# Patient Record
Sex: FEMALE | Race: NATIVE HAWAIIAN OR OTHER PACIFIC ISLANDER
[De-identification: names, ages, dates, MRNs, and addresses within clinical notes are randomized per-mention and may not be internally consistent; named-entity substitution may affect disease eponyms.]

---

## 2017-03-17 NOTE — PCM.SURG1
Surgeon's Initial Post Op Note





- Surgeon's Notes


Surgeon: Michelle Medley MD


Assistant: none


Type of Anesthesia: General LMA


Pre-Operative Diagnosis: Fibroids, pelvic pain


Operative Findings: 10 week size uterus, bilateral ostia visualized, unable to 

sound uteurs initially due to  type myoma located in uterus/ cervical 

cancer adn visulized immediately with hysteroscopy. Myosure represenative 

present for entire case.


Post-Operative Diagnosis: Submucosal myoma, pelvic pain


Operation Performed: Hystersscopic myomecetomy, Dilation and Currettage


Specimen/Specimens Removed: Endocervical currettings, endometrial currettings, 

submucosal myoma


Estimated Blood Loss: EBL {In ML}: 20


Blood Products Given: N/A


Drains Used: No Drains


Post-Op Condition: Good


Date of Surgery/Procedure: 17


Time of Surgery/Procedure: 13:00

## 2017-03-18 NOTE — OP
PROCEDURE DATE: 03/17/2017



DATE OF SURGERY:  03/17/2017



SURGEON:  Dr. Michelle Medley.



ASSISTANT:  None.



TYPE OF ANESTHESIA:  General LMA.



PREOPERATIVE DIAGNOSIS:  Fibroids.  Pelvic pain.



OPERATIVE FINDINGS:  A 12-week size uterus, bilateral ostia visualized.  Unable to sound  uterus init
ially due to aborting type myoma located within uterus/cervix but immediately visualized with hystero
scopy ____ for entire case, good removal of submucosal myoma.



POSTOPERATIVE DIAGNOSIS:  Submucosal myoma.  Pelvic pain.



OPERATION PERFORMED:  Hysteroscopic myomectomy, dilation and curettage.



SPECIMEN REMOVED:  Endocervical curettings, endometrial curettings, submucosal myoma.



ESTIMATED BLOOD LOSS:  20 mL.



BLOOD PRODUCTS:  None.



COMPLICATIONS:  None.



DESCRIPTION OF PROCEDURE:  The patient was brought to the operating room where she given anesthesia. 
 Once we had adequate anesthesia, she was placed on the operating table in dorsal supine position wit
h legs supported using stirrups.  The patient was then prepped and draped in the usual normal sterile
 fashion.  Timeout confirmed correct patient, correct procedure. 



A bimanual exam was performed with above-mentioned findings and a red rubber catheter inserted into t
he urethra to drain the bladder which was drained of 30 mL of clear yellow urine.  Following this, a 
Cedillo retractor was placed in the ____ vagina.  The cervix was adequately visualized and a single toot
h tenaculum was placed on the anterior lip of the cervix.  



The uterus was then attempted to be sounded, but was unsuccessful due to a mass noted within the cavi
ty.  A smaller dilator was then carefully inserted.  However, there was difficulty sounding, so the c
ervix was just sequentially dilated with the Adrien dilator to allow for introduction of the MyoSure hy
steroscope under direct visualization using normal saline as the distending media.  



Upon immediate visualization noted within the cervical canal in close proximity to the uterus, there 
was an enlarged myoma noted to be approximately 3 cm noted.  The MyoSure device was then activated an
d the hysteroscope carefully dissected and removed the myoma. 



Following this, we were able to enter to the fundus of the uterine cavity and there was good visualiz
ation noted by bilateral ostia.  A gentle curettage was done 360 degrees until a gritty texture was n
oted.  



All instruments were removed including the single tooth tenaculum and there was good hemostasis at th
e tenaculum puncture sites.  All instruments were removed.  At the end of the procedure, all needle, 
sponge and instrument counts were noted to be correct x 2.  The patient tolerated the procedure well 
and was transferred to recovery in stable condition.





__________________________________________

Michelle Medley MD







cc:



DD: 03/17/2017 22:06:10  [id number]

TT: 03/18/2017 06:07:07

Job # [id number]

tn



03/18/2017 07:14:19

## 2017-03-18 NOTE — OP
PROCEDURE DATE: 03/17/2017



SURGEON:  Dr. Michelle Medley.



ASSISTANT:  None.



TYPE OF ANESTHESIA:  General LMA.



PREOPERATIVE DIAGNOSES:  Fibroids, pelvic pain.



OPERATIVE FINDINGS:  Ten week size uterus, bilateral ostia visualized, unable to sound uterus initial
ly due to aborting _____ myoma located in uterus/cervical canal and immediately visualized with hyste
roscopy, MyoSure representative present for entire case.



POSTOPERATIVE DIAGNOSIS:  Submucosal myoma/_____ pelvic pain.



OPERATION PERFORMED:  Hysteroscopic myomectomy, dilation and curettage.



SPECIMEN REMOVED:  Endocervical curettings, endometrial curettings, submucosal myoma.





__________________________________________

Michelle Medley MD







cc:



DD: 03/17/2017 21:57:38  [id number]

TT: 03/18/2017 05:59:41

Job # [id number]

tn

## 2018-10-07 ENCOUNTER — HOSPITAL ENCOUNTER (EMERGENCY)
Dept: HOSPITAL 31 - C.ER | Age: 53
Discharge: HOME | End: 2018-10-07
Payer: COMMERCIAL

## 2018-10-07 VITALS — TEMPERATURE: 98.8 F | SYSTOLIC BLOOD PRESSURE: 126 MMHG | HEART RATE: 82 BPM | DIASTOLIC BLOOD PRESSURE: 80 MMHG

## 2018-10-07 VITALS — BODY MASS INDEX: 23.6 KG/M2

## 2018-10-07 VITALS — OXYGEN SATURATION: 98 % | RESPIRATION RATE: 18 BRPM

## 2018-10-07 DIAGNOSIS — J06.9: Primary | ICD-10-CM

## 2018-10-07 LAB
BILIRUB UR-MCNC: NEGATIVE MG/DL
GLUCOSE UR STRIP-MCNC: NORMAL MG/DL
HCG,QUALITATIVE URINE: NEGATIVE
LEUKOCYTE ESTERASE UR-ACNC: (no result) LEU/UL
PH UR STRIP: 7 [PH] (ref 5–8)
PROT UR STRIP-MCNC: NEGATIVE MG/DL
RBC # UR STRIP: NEGATIVE /UL
SP GR UR STRIP: 1 (ref 1–1.03)
UROBILINOGEN UR-MCNC: NORMAL MG/DL (ref 0.2–1)

## 2018-10-07 NOTE — C.PDOC
History Of Present Illness


53-year-old female, presents to the emergency department complaining of nasal 

congestion, runny nose, post-nasal drip, and cough that started six days ago. 

Patient went to urgent care and was given Zithromax, which she is taking, and 

also "left over" Amoxicillin from her refrigerator. Patient notes she also 

developed vaginal burning and dysuria. States she is unable to sleep at night 

because she is uncomfortable, prompting visit. No other complaints at this time.


Chief Complaint (Nursing): Cough, Cold, Congestion


History Per: Patient


History/Exam Limitations: no limitations





Past Medical History


Reviewed: Historical Data, Nursing Documentation, Vital Signs


Vital Signs: 





                                Last Vital Signs











Temp  98.6 F   10/07/18 10:02


 


Pulse  86   10/07/18 10:02


 


Resp  18   10/07/18 10:02


 


BP  139/90   10/07/18 10:02


 


Pulse Ox  98   10/07/18 10:02














- Medical History


PMH: HTN, Hypercholesterolemia, Migraine


Family History: States: No Known Family Hx





- Social History


Hx Alcohol Use: No


Hx Substance Use: No





- Immunization History


Hx Tetanus Toxoid Vaccination: No


Hx Influenza Vaccination: No


Hx Pneumococcal Vaccination: No





Review Of Systems


Constitutional: Negative for: Fever


ENT: Positive for: Throat Pain


Respiratory: Positive for: Cough.  Negative for: Shortness of Breath


Gastrointestinal: Negative for: Vomiting


Genitourinary: Positive for: Dysuria, Vaginal Discharge


Skin: Negative for: Rash





Physical Exam





- Physical Exam


Appears: Non-toxic, No Acute Distress


Skin: Warm, Dry, No Rash


Head: Atraumatic, Normacephalic


Eye(s): bilateral: Normal Inspection, PERRL, EOMI


Nose: Discharge


Oral Mucosa: Moist


Lips: Normal Appearing


Throat: No Erythema, No Exudate, No Drooling, No Mass


Neck: Normal ROM


Cardiovascular: Rhythm Regular, No Murmur


Respiratory: Normal Breath Sounds, No Accessory Muscle Use


Pelvic: Normal Speculum Exam, Vaginal Discharge (white.), Other (No cervicitis, 

+erythema)


Extremity: Normal ROM, No Deformity


Neurological/Psych: Oriented x3, Normal Speech





ED Course And Treatment


O2 Sat by Pulse Oximetry: 98


Pulse Ox Interpretation: Normal (RA)





- Other Rad


  ** CXR


X-Ray: Viewed By Me, Read By Radiologist


Interpretation: Accession No. : [id number].  Patient Name / ID : KRYSTYNA BARTH  / [id number].  Exam Date : 10/07/2018 10:47:50 ( Approved ).  Study 

Comment :  Sex / Age : F  / 53Y.  Creator : Ovidio Swift MD.  Dictator : 

Ovidio Swift MD.   :  Approver : Ovidio Swift MD.  Appr

over2 :  Report Date : 10/07/2018 13:45:04.  My Comment :  

********************************************************************************


***.  Date of service:  10/07/2018.  HISTORY:  cough/congestion.  COMPARISON:  

No prior.  TECHNIQUE:  Chest PA and lateral.  FINDINGS:  LUNGS:  Minor bibasilar

atelectasis and/or scarring changes left greater than right.  PLEURA:  No 

significant pleural effusion identified. No pneumothorax apparent.  

CARDIOVASCULAR:  Heart appears mildly enlarged.  OSSEOUS STRUCTURES:  No 

significant abnormalities.  VISUALIZED UPPER ABDOMEN:  Normal.  OTHER FINDINGS: 

None.  IMPRESSION:  Minor bibasilar atelectasis and/or scarring changes left 

greater than right


Progress Note: Patient is stable to be d/c home with PMD follow up.





Disposition





- Disposition


Disposition: HOME/ ROUTINE


Disposition Time: 11:33


Condition: STABLE


Additional Instructions: 


Follow up with PMD within 1-2 days. Return to ED if feel worse.


Prescriptions: 


Metronidazole [Metrogel-Vaginal] 1 ea VG QPM 7 Days #7 gel


Albuterol Sulfate [Proair Hfa] 1 puff IH Q6 PRN #1 inh


 PRN Reason: Cough


Promethazine HCl/Codeine [Prometh-Codein 6.25-10 mg/5 ml] 5 ml PO .Q4-6H #150 ml


Phenazopyridine [Pyridium] 200 mg PO TID #15 tab


Instructions:  Vaginitis, Upper Respiratory Infection (ED)


Forms:  [facility] (English), Work Excuse





- Clinical Impression


Clinical Impression: 


 Upper respiratory infection








- Scribe Statement


The provider has reviewed the documentation as recorded by the Scribe (Santos Harvey)








All medical record entries made by the Scribe were at my direction and 

personally dictated by me. I have reviewed the chart and agree that the record 

accurately reflects my personal performance of the history, physical exam, 

medical decision making, and the department course for this patient. I have also

 personally directed, reviewed, and agree with the discharge instructions and 

disposition.

## 2018-10-07 NOTE — RAD
Date of service: 



10/07/2018



HISTORY:

 cough/congestion 



COMPARISON:

No prior.



TECHNIQUE:

Chest PA and lateral



FINDINGS:



LUNGS:

Minor bibasilar atelectasis and/or scarring changes left greater than 

right



PLEURA:

No significant pleural effusion identified. No pneumothorax apparent.



CARDIOVASCULAR:

Heart appears mildly enlarged.



OSSEOUS STRUCTURES:

No significant abnormalities.



VISUALIZED UPPER ABDOMEN:

Normal.



OTHER FINDINGS:

None.



IMPRESSION:

Minor bibasilar atelectasis and/or scarring changes left greater than 

right